# Patient Record
Sex: FEMALE | Race: WHITE | NOT HISPANIC OR LATINO | URBAN - METROPOLITAN AREA
[De-identification: names, ages, dates, MRNs, and addresses within clinical notes are randomized per-mention and may not be internally consistent; named-entity substitution may affect disease eponyms.]

---

## 2024-01-09 PROCEDURE — 87081 CULTURE SCREEN ONLY: CPT

## 2024-01-10 ENCOUNTER — LAB REQUISITION (OUTPATIENT)
Dept: LAB | Facility: HOSPITAL | Age: 24
End: 2024-01-10
Payer: COMMERCIAL

## 2024-01-12 LAB — S PYO THROAT QL CULT: NORMAL

## 2024-08-23 ENCOUNTER — HOSPITAL ENCOUNTER (OUTPATIENT)
Dept: RADIOLOGY | Facility: EXTERNAL LOCATION | Age: 24
Discharge: HOME | End: 2024-08-23

## 2024-08-23 DIAGNOSIS — M79.672 LEFT FOOT PAIN: ICD-10-CM

## 2024-08-23 DIAGNOSIS — M25.572 LEFT ANKLE PAIN, UNSPECIFIED CHRONICITY: ICD-10-CM

## 2025-02-09 ENCOUNTER — ANCILLARY PROCEDURE (OUTPATIENT)
Dept: URGENT CARE | Age: 25
End: 2025-02-09
Payer: COMMERCIAL

## 2025-02-09 ENCOUNTER — OFFICE VISIT (OUTPATIENT)
Dept: URGENT CARE | Age: 25
End: 2025-02-09
Payer: COMMERCIAL

## 2025-02-09 VITALS
RESPIRATION RATE: 20 BRPM | OXYGEN SATURATION: 99 % | DIASTOLIC BLOOD PRESSURE: 77 MMHG | TEMPERATURE: 97.9 F | SYSTOLIC BLOOD PRESSURE: 122 MMHG | HEART RATE: 103 BPM

## 2025-02-09 DIAGNOSIS — S99.911A INJURY OF RIGHT ANKLE, INITIAL ENCOUNTER: ICD-10-CM

## 2025-02-09 DIAGNOSIS — S93.491A SPRAIN OF ANTERIOR TALOFIBULAR LIGAMENT OF RIGHT ANKLE, INITIAL ENCOUNTER: Primary | ICD-10-CM

## 2025-02-09 PROCEDURE — 73610 X-RAY EXAM OF ANKLE: CPT | Mod: RIGHT SIDE | Performed by: SURGERY

## 2025-02-09 RX ORDER — PREDNISONE 20 MG/1
40 TABLET ORAL DAILY
Qty: 10 TABLET | Refills: 0 | Status: SHIPPED | OUTPATIENT
Start: 2025-02-09 | End: 2025-02-14

## 2025-02-09 ASSESSMENT — PAIN SCALES - GENERAL: PAINLEVEL_OUTOF10: 8

## 2025-02-09 NOTE — PATIENT INSTRUCTIONS
Tylenol 1000 mg every 6-8 hours     Heat     For follow up, please call for appointment     [facility]      Adi Cai, STEPHEN Ortiz,        [address] [address]   [place] [address] [phone]

## 2025-02-09 NOTE — PROGRESS NOTES
Chief Complaint   Patient presents with    Injury     Twisted right ankle last night. Ankle is swollen and painful. Most of pain outer aspect and radiates up into leg and down into foot.       Physical Exam ankle:     Skin: no ecchymosis erythema  Swelling: anterolateral ankle  Deformity: None  Tenderness: ATF ligament  ROM: most pain with dorsiflexion  Joint effusion: None    Imaging:       === 02/09/25 ===    XR ANKLE 3+ VIEWS RIGHT    - Impression -  Normal radiographs of the right ankle      MACRO:  None    Signed by: Dhiraj Gamble 2/9/2025 2:02 PM  Dictation workstation:   [id number]    Assessment:     Encounter Diagnosis   Name Primary?    Sprain of anterior talofibular ligament of right ankle, initial encounter Yes          Medical Decision Making & Plan:     Tylenol + ibuprofen     RICE     [facility] sports medicine prn        02/09/25 at 4:51 PM - Misa Mckeon DO

## 2025-02-18 NOTE — PROGRESS NOTES
ANNUAL GYNECOLOGIC VISIT  Subjective   HPI:  Linnette German is a 24 y.o. female  Patient's last menstrual period was 2025 (exact date). for annual exam.    Complaints:   No  Periods: Regular, every 28 days, lasts for 6 days, heavy bleeding first few days of cycles  Dysmenorrhea:  Cramping improves with Motrin    GYN History:   Menarche age:  11  HPV Vaccine:  Completed   Sexual activity: Yes, male boyfriend, no issues   Current contraception:   Not using, not interested in starting, not using condoms   History of abnormal pap:   Has never had  Family history: Denies family history of breast, colon, vulvar, vaginal, cervical, uterine, or ovarian cancer.   +Paternal grandmother with breast cancer     Last pap smear: No Cervical Cancer Screening results to display.    OB History          0    Para   0    Term   0       0    AB   0    Living   0         SAB   0    IAB   0    Ectopic   0    Multiple   0    Live Births   0                Past Medical History:   Diagnosis Date    Anxiety and depression      Past Surgical History:   Procedure Laterality Date    KNEE LIGAMENT RECONSTRUCTION  2013    ACL     No current outpatient medications on file.     Social History     Tobacco Use    Smoking status: Former     Types: Cigarettes    Smokeless tobacco: Never   Vaping Use    Vaping status: Former    Substances: Nicotine   Substance Use Topics    Alcohol use: Yes     Comment: 1-2    Drug use: Yes     Types: Marijuana     Comment: smokes      Objective   /72   Wt 75.7 kg (166 lb 12.8 oz)   LMP 2025 (Exact Date)   BMI 28.63 kg/m²     Physical Exam:  General: Alert and oriented, in no acute distress.  Psych: Normal affect and mood. Able to answer questions appropriately.   Heart: Regular rate.  Lungs: Non labored respirations.  Abdomen: Soft, non-tender, without masses or organomegaly.  GYN:  Speculum:  Vulva: Normal architecture without erythema, masses, or lesions.   Vagina: Normal  moist mucosa without lesions, masses, or atrophy. No abnormal vaginal discharge.   Cervix: Normal without erythema, masses, lesions, or signs of cervicitis.  Bimanual:   Cervix: No cervical motion tenderness.  Uterus: Normal, mobile, non-enlarged, non tender uterus.  Adnexa: Normal without tenderness, nodularity, masses, or lesions.    Assessment/Plan     24 y.o.  , here for annual GYN exam   Assessment & Plan  Encounter for annual routine gynecological examination  -Routine annual  -Pap due today  -STI screening declined  -Birth control counseling:  Discussed contraception and patient declined. Encouraged barrier methods of contraception for prevention of STIs.   -Reviewed preventative hygiene habits.  -Reviewed health promoting habits: Exercise: 30-60 minutes 3 to 5 days/week. Diet: Healthy diet and drink plenty of water each day.  -Patient to return in 1 year for annual GYN visit, or sooner as clinically indicated        Screening for cervical cancer  -Has never had a pap smear, collected today  Orders:    THINPREP PAP TEST    Family history of cancer  -Patient desires hereditary testing due to FH of breast cancer  -Orders placed  Orders:    CancerNext; Future      Willard Scott MD

## 2025-02-19 ENCOUNTER — OFFICE VISIT (OUTPATIENT)
Dept: OBSTETRICS AND GYNECOLOGY | Facility: CLINIC | Age: 25
End: 2025-02-19
Payer: COMMERCIAL

## 2025-02-19 VITALS — DIASTOLIC BLOOD PRESSURE: 72 MMHG | WEIGHT: 166.8 LBS | SYSTOLIC BLOOD PRESSURE: 122 MMHG | BODY MASS INDEX: 28.63 KG/M2

## 2025-02-19 DIAGNOSIS — Z01.419 ENCOUNTER FOR ANNUAL ROUTINE GYNECOLOGICAL EXAMINATION: Primary | ICD-10-CM

## 2025-02-19 DIAGNOSIS — Z80.9 FAMILY HISTORY OF CANCER: ICD-10-CM

## 2025-02-19 DIAGNOSIS — Z12.4 SCREENING FOR CERVICAL CANCER: ICD-10-CM

## 2025-02-19 PROCEDURE — 1036F TOBACCO NON-USER: CPT | Performed by: STUDENT IN AN ORGANIZED HEALTH CARE EDUCATION/TRAINING PROGRAM

## 2025-02-19 PROCEDURE — 99385 PREV VISIT NEW AGE 18-39: CPT | Performed by: STUDENT IN AN ORGANIZED HEALTH CARE EDUCATION/TRAINING PROGRAM

## 2025-02-19 ASSESSMENT — PAIN SCALES - GENERAL: PAINLEVEL_OUTOF10: 0-NO PAIN

## 2025-02-19 ASSESSMENT — PATIENT HEALTH QUESTIONNAIRE - PHQ9
2. FEELING DOWN, DEPRESSED OR HOPELESS: NOT AT ALL
SUM OF ALL RESPONSES TO PHQ9 QUESTIONS 1 & 2: 0
1. LITTLE INTEREST OR PLEASURE IN DOING THINGS: NOT AT ALL

## 2025-02-19 ASSESSMENT — LIFESTYLE VARIABLES
AUDIT-C TOTAL SCORE: 2
HOW MANY STANDARD DRINKS CONTAINING ALCOHOL DO YOU HAVE ON A TYPICAL DAY: 1 OR 2
HOW OFTEN DO YOU HAVE A DRINK CONTAINING ALCOHOL: MONTHLY OR LESS
SKIP TO QUESTIONS 9-10: 0
HOW OFTEN DO YOU HAVE SIX OR MORE DRINKS ON ONE OCCASION: LESS THAN MONTHLY

## 2025-02-19 ASSESSMENT — ENCOUNTER SYMPTOMS
DEPRESSION: 0
LOSS OF SENSATION IN FEET: 0
OCCASIONAL FEELINGS OF UNSTEADINESS: 0

## 2025-03-02 LAB
CYTOLOGY CMNT CVX/VAG CYTO-IMP: NORMAL
LAB AP HPV HR: NORMAL
LABORATORY COMMENT REPORT: NORMAL
PATH REPORT.TOTAL CANCER: NORMAL